# Patient Record
Sex: MALE | Race: WHITE | NOT HISPANIC OR LATINO | Employment: FULL TIME | URBAN - METROPOLITAN AREA
[De-identification: names, ages, dates, MRNs, and addresses within clinical notes are randomized per-mention and may not be internally consistent; named-entity substitution may affect disease eponyms.]

---

## 2023-10-29 ASSESSMENT — ENCOUNTER SYMPTOMS
DIZZINESS: 0
SORE THROAT: 0
MYALGIAS: 0
DIFFICULTY URINATING: 0
DYSPHORIC MOOD: 0
EYE PAIN: 0
SHORTNESS OF BREATH: 0
ABDOMINAL PAIN: 0
ARTHRALGIAS: 0
PALPITATIONS: 0
NERVOUS/ANXIOUS: 0
FATIGUE: 0
HEADACHES: 0

## 2023-10-29 NOTE — PROGRESS NOTES
"Subjective   Patient ID: Med Thomson is a 48 y.o. male who presents for Annual Exam (Cpe.).    HPI   Presents for physical exam. Here for wellness exam for insurance/work. No c/o.  Is fasting.   Has been watching diet.  Doing some exercise several days per week.     Review of Systems   Constitutional:  Negative for fatigue.   HENT:  Negative for congestion, ear pain and sore throat.         Gets mild allergic rhinitis in the spring for which he rarely uses Allegra as needed and it works well.     Eyes:  Negative for pain and visual disturbance.   Respiratory:  Negative for shortness of breath.    Cardiovascular:  Negative for chest pain and palpitations.   Gastrointestinal:  Negative for abdominal pain, constipation, diarrhea, nausea and vomiting.        GERD well controlled with Prilosec. Sees GI Dr Vela due to hx of Barretts.   Still taking Desipramine 10mg qd as well.  Had EGD 7/2020. Had colonoscopy in 9/2022.     Genitourinary:  Negative for difficulty urinating and testicular pain.   Musculoskeletal:  Negative for arthralgias and myalgias.   Skin:  Negative for rash.   Neurological:  Negative for dizziness and headaches.   Psychiatric/Behavioral:  Negative for dysphoric mood. The patient is not nervous/anxious.      Past Medical History:   Diagnosis Date    Personal history of other diseases of the digestive system     History of esophageal reflux      Family History   Problem Relation Name Age of Onset    Rheum arthritis Mother      Other (HTN) Father      Melanoma Mother's Brother        Social History     Tobacco Use    Smoking status: Never    Smokeless tobacco: Never   Substance Use Topics    Alcohol use: Yes     Comment: few drinks per month    Drug use: Never          Objective   /74   Pulse 85   Temp 36.4 °C (97.5 °F)   Ht 1.715 m (5' 7.5\")   Wt 87.5 kg (193 lb)   SpO2 99%   BMI 29.78 kg/m²     Physical Exam  Vitals and nursing note reviewed.   Constitutional:       Appearance: Normal " appearance. He is well-developed.   HENT:      Head: Normocephalic.      Right Ear: Tympanic membrane, ear canal and external ear normal.      Left Ear: Tympanic membrane, ear canal and external ear normal.      Nose: Nose normal.      Mouth/Throat:      Mouth: Mucous membranes are moist.      Pharynx: Oropharynx is clear.   Eyes:      General: No scleral icterus.     Extraocular Movements: Extraocular movements intact.      Pupils: Pupils are equal, round, and reactive to light.   Cardiovascular:      Rate and Rhythm: Normal rate and regular rhythm.      Heart sounds: No murmur heard.  Pulmonary:      Effort: Pulmonary effort is normal.      Breath sounds: Normal breath sounds.   Abdominal:      Palpations: Abdomen is soft. There is no mass.      Tenderness: There is no abdominal tenderness.   Musculoskeletal:         General: Normal range of motion.      Cervical back: Neck supple.   Lymphadenopathy:      Cervical: No cervical adenopathy.   Skin:     General: Skin is warm and dry.   Neurological:      General: No focal deficit present.      Mental Status: He is alert.      Gait: Gait normal.   Psychiatric:         Mood and Affect: Mood normal.         Behavior: Behavior normal.       Assessment/Plan   Diagnoses and all orders for this visit:  Routine general medical examination at a health care facility  -     Lipid Panel; Future  -     Basic Metabolic Panel; Future  Encounter for immunization  -     Flu vaccine (IIV4) age 6 months and greater, preservative free      Discussed wellness issues.  Discussed diet and exercise.    Get fasting labs.   Encouraged weight loss due to high BMI.   Flu shot given.   Follow up as needed.

## 2023-10-30 ENCOUNTER — OFFICE VISIT (OUTPATIENT)
Dept: PRIMARY CARE | Facility: CLINIC | Age: 48
End: 2023-10-30
Payer: COMMERCIAL

## 2023-10-30 ENCOUNTER — LAB (OUTPATIENT)
Dept: LAB | Facility: LAB | Age: 48
End: 2023-10-30
Payer: COMMERCIAL

## 2023-10-30 VITALS
DIASTOLIC BLOOD PRESSURE: 74 MMHG | HEIGHT: 68 IN | WEIGHT: 193 LBS | SYSTOLIC BLOOD PRESSURE: 126 MMHG | HEART RATE: 85 BPM | OXYGEN SATURATION: 99 % | BODY MASS INDEX: 29.25 KG/M2 | TEMPERATURE: 97.5 F

## 2023-10-30 DIAGNOSIS — Z23 ENCOUNTER FOR IMMUNIZATION: ICD-10-CM

## 2023-10-30 DIAGNOSIS — Z00.00 ROUTINE GENERAL MEDICAL EXAMINATION AT A HEALTH CARE FACILITY: Primary | ICD-10-CM

## 2023-10-30 DIAGNOSIS — Z00.00 ROUTINE GENERAL MEDICAL EXAMINATION AT A HEALTH CARE FACILITY: ICD-10-CM

## 2023-10-30 PROBLEM — J45.909 ASTHMA WITHOUT STATUS ASTHMATICUS (HHS-HCC): Status: ACTIVE | Noted: 2023-10-30

## 2023-10-30 PROBLEM — E78.5 HYPERLIPIDEMIA: Status: ACTIVE | Noted: 2017-12-07

## 2023-10-30 LAB
ANION GAP SERPL CALC-SCNC: 11 MMOL/L (ref 10–20)
BUN SERPL-MCNC: 19 MG/DL (ref 6–23)
CALCIUM SERPL-MCNC: 10 MG/DL (ref 8.6–10.3)
CHLORIDE SERPL-SCNC: 103 MMOL/L (ref 98–107)
CHOLEST SERPL-MCNC: 213 MG/DL (ref 0–199)
CHOLESTEROL/HDL RATIO: 6
CO2 SERPL-SCNC: 29 MMOL/L (ref 21–32)
CREAT SERPL-MCNC: 0.99 MG/DL (ref 0.5–1.3)
GFR SERPL CREATININE-BSD FRML MDRD: >90 ML/MIN/1.73M*2
GLUCOSE SERPL-MCNC: 89 MG/DL (ref 74–99)
HDLC SERPL-MCNC: 35.6 MG/DL
LDLC SERPL CALC-MCNC: 126 MG/DL
NON HDL CHOLESTEROL: 177 MG/DL (ref 0–149)
POTASSIUM SERPL-SCNC: 4.2 MMOL/L (ref 3.5–5.3)
SODIUM SERPL-SCNC: 139 MMOL/L (ref 136–145)
TRIGL SERPL-MCNC: 256 MG/DL (ref 0–149)
VLDL: 51 MG/DL (ref 0–40)

## 2023-10-30 PROCEDURE — 90471 IMMUNIZATION ADMIN: CPT | Performed by: PHYSICIAN ASSISTANT

## 2023-10-30 PROCEDURE — 80048 BASIC METABOLIC PNL TOTAL CA: CPT

## 2023-10-30 PROCEDURE — 1036F TOBACCO NON-USER: CPT | Performed by: PHYSICIAN ASSISTANT

## 2023-10-30 PROCEDURE — 99396 PREV VISIT EST AGE 40-64: CPT | Performed by: PHYSICIAN ASSISTANT

## 2023-10-30 PROCEDURE — 36415 COLL VENOUS BLD VENIPUNCTURE: CPT

## 2023-10-30 PROCEDURE — 90686 IIV4 VACC NO PRSV 0.5 ML IM: CPT | Performed by: PHYSICIAN ASSISTANT

## 2023-10-30 PROCEDURE — 80061 LIPID PANEL: CPT

## 2023-10-30 RX ORDER — DESIPRAMINE HYDROCHLORIDE 10 MG/1
10 TABLET ORAL DAILY
COMMUNITY
End: 2024-05-16

## 2023-10-30 RX ORDER — OMEPRAZOLE 20 MG/1
20 CAPSULE, DELAYED RELEASE ORAL DAILY
COMMUNITY
Start: 2023-10-19 | End: 2023-11-14 | Stop reason: DRUGHIGH

## 2023-10-30 ASSESSMENT — ENCOUNTER SYMPTOMS
NAUSEA: 0
VOMITING: 0
CONSTIPATION: 0
DIARRHEA: 0

## 2023-11-14 DIAGNOSIS — K21.9 GASTROESOPHAGEAL REFLUX DISEASE, UNSPECIFIED WHETHER ESOPHAGITIS PRESENT: Primary | ICD-10-CM

## 2023-11-14 RX ORDER — OMEPRAZOLE 40 MG/1
40 CAPSULE, DELAYED RELEASE ORAL DAILY
Qty: 30 CAPSULE | Refills: 2 | Status: SHIPPED | OUTPATIENT
Start: 2023-11-14 | End: 2024-02-16

## 2024-01-22 ENCOUNTER — OFFICE VISIT (OUTPATIENT)
Dept: PRIMARY CARE | Facility: CLINIC | Age: 49
End: 2024-01-22
Payer: COMMERCIAL

## 2024-01-22 VITALS
TEMPERATURE: 97.2 F | HEART RATE: 77 BPM | DIASTOLIC BLOOD PRESSURE: 74 MMHG | SYSTOLIC BLOOD PRESSURE: 136 MMHG | OXYGEN SATURATION: 96 % | WEIGHT: 196 LBS | BODY MASS INDEX: 30.24 KG/M2

## 2024-01-22 DIAGNOSIS — H69.93 DYSFUNCTION OF BOTH EUSTACHIAN TUBES: Primary | ICD-10-CM

## 2024-01-22 PROCEDURE — 99213 OFFICE O/P EST LOW 20 MIN: CPT | Performed by: PHYSICIAN ASSISTANT

## 2024-01-22 PROCEDURE — 1036F TOBACCO NON-USER: CPT | Performed by: PHYSICIAN ASSISTANT

## 2024-01-22 RX ORDER — FLUTICASONE PROPIONATE 50 MCG
2 SPRAY, SUSPENSION (ML) NASAL DAILY
Qty: 16 G | Refills: 0 | Status: SHIPPED | OUTPATIENT
Start: 2024-01-22 | End: 2024-02-26

## 2024-01-22 ASSESSMENT — ENCOUNTER SYMPTOMS: SHORTNESS OF BREATH: 0

## 2024-01-22 NOTE — PROGRESS NOTES
"Subjective   Patient ID: Rickey Thomson \"Med\" is a 48 y.o. male who presents for Ear Fullness (L ear pain x 4 days///Pt had covid before frantz).    HPI     Left ear has been muffled the past month. Sometimes pops a little. Pressure is increasing   Had COVID just before Frantz. Still had some congestion for a couple weeks afterward but eventually resolved.  Ears started feeling muffled during this time.  Is getting a little bit more frequent the past 4 days though.    No fevers or chills.  No significant cough remaining.     reports that he has never smoked. He has never used smokeless tobacco.    Review of Systems   Respiratory:  Negative for shortness of breath.        Objective   /74   Pulse 77   Temp 36.2 °C (97.2 °F)   Wt 88.9 kg (196 lb)   SpO2 96%   BMI 30.24 kg/m²     Physical Exam  Vitals and nursing note reviewed.   Constitutional:       Appearance: Normal appearance.   HENT:      Head: Normocephalic.      Right Ear: Ear canal and external ear normal.      Left Ear: Ear canal and external ear normal.      Ears:      Comments: Increased fluid behind TMs (left> right), without erythema.     Nose: No congestion.      Right Sinus: Frontal sinus tenderness present.      Left Sinus: Frontal sinus tenderness present.      Mouth/Throat:      Mouth: Mucous membranes are moist.      Pharynx: No oropharyngeal exudate or posterior oropharyngeal erythema.   Eyes:      General: No scleral icterus.  Cardiovascular:      Rate and Rhythm: Normal rate and regular rhythm.   Pulmonary:      Effort: Pulmonary effort is normal.      Breath sounds: Normal breath sounds.   Lymphadenopathy:      Cervical: No cervical adenopathy.   Skin:     General: Skin is warm and dry.   Neurological:      Mental Status: He is alert.       Assessment/Plan   Diagnoses and all orders for this visit:  Dysfunction of both eustachian tubes  -     fluticasone (Flonase) 50 mcg/actuation nasal spray; Administer 2 sprays into each " nostril once daily.     Rx Flonase.  Can use OTC Mucinex or decongestant.  Hydrate well.  Follow-up if symptoms significant increase or persist.

## 2024-02-16 DIAGNOSIS — K21.9 GASTROESOPHAGEAL REFLUX DISEASE, UNSPECIFIED WHETHER ESOPHAGITIS PRESENT: ICD-10-CM

## 2024-02-16 RX ORDER — OMEPRAZOLE 40 MG/1
40 CAPSULE, DELAYED RELEASE ORAL DAILY
Qty: 90 CAPSULE | Refills: 0 | Status: SHIPPED | OUTPATIENT
Start: 2024-02-16 | End: 2024-05-14

## 2024-02-26 ENCOUNTER — OFFICE VISIT (OUTPATIENT)
Dept: PRIMARY CARE | Facility: CLINIC | Age: 49
End: 2024-02-26
Payer: COMMERCIAL

## 2024-02-26 VITALS
OXYGEN SATURATION: 98 % | DIASTOLIC BLOOD PRESSURE: 96 MMHG | SYSTOLIC BLOOD PRESSURE: 148 MMHG | TEMPERATURE: 97.5 F | HEART RATE: 101 BPM

## 2024-02-26 DIAGNOSIS — J01.90 ACUTE SINUSITIS, RECURRENCE NOT SPECIFIED, UNSPECIFIED LOCATION: Primary | ICD-10-CM

## 2024-02-26 PROCEDURE — 99214 OFFICE O/P EST MOD 30 MIN: CPT | Performed by: PHYSICIAN ASSISTANT

## 2024-02-26 PROCEDURE — 1036F TOBACCO NON-USER: CPT | Performed by: PHYSICIAN ASSISTANT

## 2024-02-26 RX ORDER — AMOXICILLIN AND CLAVULANATE POTASSIUM 875; 125 MG/1; MG/1
875 TABLET, FILM COATED ORAL 2 TIMES DAILY
Qty: 20 TABLET | Refills: 0 | Status: SHIPPED | OUTPATIENT
Start: 2024-02-26 | End: 2024-03-07

## 2024-02-26 ASSESSMENT — ENCOUNTER SYMPTOMS
PALPITATIONS: 0
ABDOMINAL PAIN: 0

## 2024-02-26 NOTE — PROGRESS NOTES
"Subjective   Patient ID: Rickey Thomson \"Med\" is a 48 y.o. male who presents for Sinusitis (Facial pain, sinus pressure, Drainage, HA x 8 to 9 days).    HPI   Patient c/o cough, nasal discharge or sinus pain. Denies fever, chills, aches, shortness of breath and sore throat.  Present for 9 days. Has worsened since onset.   Cough: Cough is mildly productive of greenish sputum -- thinks mostly from PND.   Nasal discharge: Described as purulent.   Sinus pain: Experiencing frontal pain and facial pain.   Denies associated diarrhea, earache and vomiting.     Taking OTC sudafed and mucinex. Using Flonase.     Patient denies recent known COVID exposure or international travel.        reports that he has never smoked. He has never used smokeless tobacco.    Review of Systems   Cardiovascular:  Negative for chest pain and palpitations.   Gastrointestinal:  Negative for abdominal pain.       Objective   BP (!) 148/96   Pulse 101   Temp 36.4 °C (97.5 °F)   SpO2 98%     Physical Exam  Vitals and nursing note reviewed.   Constitutional:       Appearance: Normal appearance.   HENT:      Head: Normocephalic.      Right Ear: Tympanic membrane and ear canal normal.      Left Ear: Tympanic membrane and ear canal normal.      Nose: Congestion present.      Right Sinus: Maxillary sinus tenderness and frontal sinus tenderness present.      Left Sinus: Maxillary sinus tenderness and frontal sinus tenderness present.      Mouth/Throat:      Mouth: Mucous membranes are moist.      Pharynx: No oropharyngeal exudate or posterior oropharyngeal erythema.   Eyes:      General: No scleral icterus.  Cardiovascular:      Rate and Rhythm: Normal rate and regular rhythm.   Pulmonary:      Effort: Pulmonary effort is normal.      Breath sounds: Normal breath sounds.   Lymphadenopathy:      Cervical: No cervical adenopathy.   Skin:     General: Skin is warm and dry.   Neurological:      Mental Status: He is alert.         Assessment/Plan "   Diagnoses and all orders for this visit:  Acute sinusitis, recurrence not specified, unspecified location  -     amoxicillin-pot clavulanate (Augmentin) 875-125 mg tablet; Take 1 tablet (875 mg) by mouth 2 times a day for 10 days.       Rx augmentin.   Can use Mucinex DM.   Encourage fluids and rest.   Follow up if symptoms increase or persist.

## 2024-05-14 DIAGNOSIS — K21.9 GASTROESOPHAGEAL REFLUX DISEASE, UNSPECIFIED WHETHER ESOPHAGITIS PRESENT: ICD-10-CM

## 2024-05-14 RX ORDER — OMEPRAZOLE 40 MG/1
40 CAPSULE, DELAYED RELEASE ORAL DAILY
Qty: 90 CAPSULE | Refills: 0 | Status: SHIPPED | OUTPATIENT
Start: 2024-05-14

## 2024-05-15 DIAGNOSIS — K30 FUNCTIONAL DYSPEPSIA: ICD-10-CM

## 2024-05-16 RX ORDER — DESIPRAMINE HYDROCHLORIDE 10 MG/1
10 TABLET ORAL DAILY
Qty: 90 TABLET | Refills: 2 | Status: SHIPPED | OUTPATIENT
Start: 2024-05-16

## 2024-06-12 DIAGNOSIS — K30 FUNCTIONAL DYSPEPSIA: ICD-10-CM

## 2024-06-13 RX ORDER — DESIPRAMINE HYDROCHLORIDE 10 MG/1
10 TABLET ORAL DAILY
Qty: 90 TABLET | Refills: 0 | Status: SHIPPED | OUTPATIENT
Start: 2024-06-13

## 2024-06-26 ENCOUNTER — APPOINTMENT (OUTPATIENT)
Dept: GASTROENTEROLOGY | Facility: CLINIC | Age: 49
End: 2024-06-26
Payer: COMMERCIAL

## 2024-06-26 VITALS — WEIGHT: 190 LBS | BODY MASS INDEX: 28.79 KG/M2 | HEIGHT: 68 IN

## 2024-06-26 DIAGNOSIS — K22.70 BARRETT'S ESOPHAGUS WITHOUT DYSPLASIA: Primary | ICD-10-CM

## 2024-06-26 DIAGNOSIS — K30 FUNCTIONAL DYSPEPSIA: ICD-10-CM

## 2024-06-26 DIAGNOSIS — K21.9 GASTROESOPHAGEAL REFLUX DISEASE, UNSPECIFIED WHETHER ESOPHAGITIS PRESENT: ICD-10-CM

## 2024-06-26 PROCEDURE — 99213 OFFICE O/P EST LOW 20 MIN: CPT | Performed by: INTERNAL MEDICINE

## 2024-06-26 PROCEDURE — 1036F TOBACCO NON-USER: CPT | Performed by: INTERNAL MEDICINE

## 2024-06-26 RX ORDER — OMEPRAZOLE 40 MG/1
40 CAPSULE, DELAYED RELEASE ORAL DAILY
Qty: 90 CAPSULE | Refills: 3 | Status: SHIPPED | OUTPATIENT
Start: 2024-06-26

## 2024-06-26 RX ORDER — DESIPRAMINE HYDROCHLORIDE 10 MG/1
10 TABLET ORAL DAILY
Qty: 90 TABLET | Refills: 3 | Status: SHIPPED | OUTPATIENT
Start: 2024-06-26

## 2024-06-26 NOTE — PROGRESS NOTES
REASON FOR VISIT: Follow-up reflux and stomach discomfort    HPI:  Rickey Thomson is a 49 y.o. male with a past medical history of chronic GERD as well as nondysplastic Messer's esophagus as well as functional dyspepsia being evaluated in the office for follow-up.  Last seen about 1 year ago.  Last upper endoscopy intestinal metaplasia was actually not seen.  Due for surveillance upper endoscopy in a year.  Taking omeprazole 40 mg once daily in the morning which controls his GERD symptoms.  When he tried to reduce to 20 mg he had significant breakthrough symptoms of GERD.  No dysphagia symptoms.  Dyspepsia controlled on his chronic use of desipramine 10 mg daily.  Has to watch certain foods including foods with red sauce as well as eggs which can be triggers for his dyspepsia symptoms.  No other complaints at this time.  No rectal bleeding or melena.          PRIOR ENDOSCOPY    PAST MEDICAL HISTORY  Past Medical History:   Diagnosis Date    Personal history of other diseases of the digestive system     History of esophageal reflux       PAST SURGICAL HISTORY  No past surgical history on file.    FAMILY HISTORY  Family History   Problem Relation Name Age of Onset    Rheum arthritis Mother      Other (HTN) Father      Melanoma Mother's Brother         SOCIAL HISTORY  Social History     Tobacco Use    Smoking status: Never    Smokeless tobacco: Never   Substance Use Topics    Alcohol use: Not Currently     Comment: few drinks per month       REVIEW OF SYSTEMS  CONSTITUTIONAL: negative for fever, chills, fatigue, or unintentional weight loss,   HEENT: negative for icteric sclera, eye pain/redness, or changes in vision/hearing  RESPIRATORY: negative for cough, hemoptysis, wheezing, orthopnea, or dyspnea on exertion  CARDIOVASCULAR: negative for chest pain, palpitations, or syncope   GASTROINTESTINAL: as noted per HPI  GENITOURINARY: negative for dysuria, polyuria, incontinence, or hematuria  MUSCULOSKELETAL: negative  "for arthralgia, myalgia, or joint swelling/stiffness   INTEGUMENTARY/SKIN: negative jaundice, rash, or skin lesion  HEMATOLOGIC/LYMPHATIC: negative for prolonged bleeding, easy bruising, or swollen lymph nodes  ENDOCRINE: negative for cold/heat intolerance, polydipsia, polyuria, or goiter  NEUROLOGIC: negative for headaches, dizziness, tremor, or gait abnormality  PSYCHIATRIC: negative for anxiety, depression, personality changes, or sleep disturbances      A 10 point review of systems was completed and was otherwise negative.    ALLERGIES  No Known Allergies    MEDICATIONS  Current Outpatient Medications   Medication Sig Dispense Refill    desipramine (Norpramin) 10 mg tablet Take 1 tablet (10 mg) by mouth once daily. 90 tablet 3    fluticasone (Flonase) 50 mcg/actuation nasal spray Administer 2 sprays into each nostril once daily. 16 g 0    omeprazole (PriLOSEC) 40 mg DR capsule Take 1 capsule (40 mg) by mouth once daily. 90 capsule 3     No current facility-administered medications for this visit.       VITALS  Ht 1.715 m (5' 7.5\")   Wt 86.2 kg (190 lb)   BMI 29.32 kg/m²      PHYSICAL EXAM  Alert and oriented in no acute distress  Cardiac: Regular rate and rhythm with no murmurs rubs or gallops  Lungs: Clear to auscultation bilaterally  Abdomen: Soft, minimal left upper quadrant tenderness with palpation, no hepatosplenomegaly, no hernias    ASSESSMENT/ PLAN  Patient with functional dyspepsia controlled with desipramine 10 mg daily which we will continue.  History of GERD and nondysplastic Messer's esophagus.  Will continue omeprazole 40 mg once daily.  No alarm symptoms at this time.  Due for surveillance upper endoscopy in a year.  He is in agreement with the plan.        Signature: Yrn Vela MD    Date: 6/26/2024  Time: 3:16 PM    "

## 2024-11-11 ENCOUNTER — LAB (OUTPATIENT)
Dept: LAB | Facility: LAB | Age: 49
End: 2024-11-11
Payer: COMMERCIAL

## 2024-11-11 ENCOUNTER — APPOINTMENT (OUTPATIENT)
Dept: PRIMARY CARE | Facility: CLINIC | Age: 49
End: 2024-11-11
Payer: COMMERCIAL

## 2024-11-11 VITALS
BODY MASS INDEX: 29.4 KG/M2 | DIASTOLIC BLOOD PRESSURE: 74 MMHG | TEMPERATURE: 97.5 F | OXYGEN SATURATION: 97 % | HEART RATE: 87 BPM | HEIGHT: 68 IN | SYSTOLIC BLOOD PRESSURE: 124 MMHG | WEIGHT: 194 LBS

## 2024-11-11 DIAGNOSIS — Z00.00 ROUTINE GENERAL MEDICAL EXAMINATION AT A HEALTH CARE FACILITY: Primary | ICD-10-CM

## 2024-11-11 DIAGNOSIS — Z11.4 SCREENING FOR HIV (HUMAN IMMUNODEFICIENCY VIRUS): ICD-10-CM

## 2024-11-11 DIAGNOSIS — Z00.00 ROUTINE GENERAL MEDICAL EXAMINATION AT A HEALTH CARE FACILITY: ICD-10-CM

## 2024-11-11 DIAGNOSIS — Z23 ENCOUNTER FOR IMMUNIZATION: ICD-10-CM

## 2024-11-11 DIAGNOSIS — Z11.59 ENCOUNTER FOR HEPATITIS C SCREENING TEST FOR LOW RISK PATIENT: ICD-10-CM

## 2024-11-11 LAB
ANION GAP SERPL CALC-SCNC: 10 MMOL/L (ref 10–20)
BUN SERPL-MCNC: 20 MG/DL (ref 6–23)
CALCIUM SERPL-MCNC: 9.5 MG/DL (ref 8.6–10.3)
CHLORIDE SERPL-SCNC: 103 MMOL/L (ref 98–107)
CHOLEST SERPL-MCNC: 198 MG/DL (ref 0–199)
CHOLESTEROL/HDL RATIO: 5.2
CO2 SERPL-SCNC: 32 MMOL/L (ref 21–32)
CREAT SERPL-MCNC: 1.04 MG/DL (ref 0.5–1.3)
EGFRCR SERPLBLD CKD-EPI 2021: 88 ML/MIN/1.73M*2
GLUCOSE SERPL-MCNC: 90 MG/DL (ref 74–99)
HCV AB SER QL: NONREACTIVE
HDLC SERPL-MCNC: 37.8 MG/DL
HIV 1+2 AB+HIV1 P24 AG SERPL QL IA: NONREACTIVE
LDLC SERPL CALC-MCNC: 120 MG/DL
NON HDL CHOLESTEROL: 160 MG/DL (ref 0–149)
POTASSIUM SERPL-SCNC: 4.5 MMOL/L (ref 3.5–5.3)
SODIUM SERPL-SCNC: 140 MMOL/L (ref 136–145)
TRIGL SERPL-MCNC: 199 MG/DL (ref 0–149)
VLDL: 40 MG/DL (ref 0–40)

## 2024-11-11 PROCEDURE — 99396 PREV VISIT EST AGE 40-64: CPT | Performed by: PHYSICIAN ASSISTANT

## 2024-11-11 PROCEDURE — 80061 LIPID PANEL: CPT

## 2024-11-11 PROCEDURE — 80048 BASIC METABOLIC PNL TOTAL CA: CPT

## 2024-11-11 PROCEDURE — 1036F TOBACCO NON-USER: CPT | Performed by: PHYSICIAN ASSISTANT

## 2024-11-11 PROCEDURE — 90471 IMMUNIZATION ADMIN: CPT | Performed by: PHYSICIAN ASSISTANT

## 2024-11-11 PROCEDURE — 36415 COLL VENOUS BLD VENIPUNCTURE: CPT

## 2024-11-11 PROCEDURE — 86803 HEPATITIS C AB TEST: CPT

## 2024-11-11 PROCEDURE — 90656 IIV3 VACC NO PRSV 0.5 ML IM: CPT | Performed by: PHYSICIAN ASSISTANT

## 2024-11-11 PROCEDURE — 87389 HIV-1 AG W/HIV-1&-2 AB AG IA: CPT

## 2024-11-11 PROCEDURE — 3008F BODY MASS INDEX DOCD: CPT | Performed by: PHYSICIAN ASSISTANT

## 2024-11-11 ASSESSMENT — ENCOUNTER SYMPTOMS
NERVOUS/ANXIOUS: 0
FATIGUE: 0
HEADACHES: 0
ABDOMINAL PAIN: 0
DIZZINESS: 0
PALPITATIONS: 0
DIARRHEA: 0
VOMITING: 0
CONSTIPATION: 0
EYE PAIN: 0
DIFFICULTY URINATING: 0
ARTHRALGIAS: 0
NAUSEA: 0
DYSPHORIC MOOD: 0
SORE THROAT: 0
MYALGIAS: 0
SHORTNESS OF BREATH: 0

## 2024-11-11 ASSESSMENT — PATIENT HEALTH QUESTIONNAIRE - PHQ9
2. FEELING DOWN, DEPRESSED OR HOPELESS: NOT AT ALL
SUM OF ALL RESPONSES TO PHQ9 QUESTIONS 1 AND 2: 0
1. LITTLE INTEREST OR PLEASURE IN DOING THINGS: NOT AT ALL

## 2024-11-11 NOTE — PROGRESS NOTES
"Subjective   Patient ID: Med Thomson is a 49 y.o. male who presents for Annual Exam (cpe).    HPI   Presents for physical exam. Here for wellness exam for insurance/work. No c/o.  Is fasting.   Still watching diet.  Doing some exercise several days per week.     Review of Systems   Constitutional:  Negative for fatigue.   HENT:  Negative for congestion, ear pain and sore throat.         Gets mild allergic rhinitis in the spring for which he rarely uses Allegra as needed and it works well.     Eyes:  Negative for pain and visual disturbance.   Respiratory:  Negative for shortness of breath.    Cardiovascular:  Negative for chest pain and palpitations.   Gastrointestinal:  Negative for abdominal pain, constipation, diarrhea, nausea and vomiting.        GERD well controlled with Prilosec. Sees GI Dr Vela due to hx of Barretts.   Still taking Desipramine 10mg qd as well.  Had EGD 7/2020 --due in 2025. Had colonoscopy in 9/2022 -- due in 2027.      Genitourinary:  Negative for difficulty urinating and testicular pain.   Musculoskeletal:  Negative for arthralgias and myalgias.   Skin:  Negative for rash.   Neurological:  Negative for dizziness and headaches.   Psychiatric/Behavioral:  Negative for dysphoric mood. The patient is not nervous/anxious.      Past Medical History:   Diagnosis Date    Personal history of other diseases of the digestive system     History of esophageal reflux      Family History   Problem Relation Name Age of Onset    Rheum arthritis Mother      Other (HTN) Father      Atrial fibrillation Father      Melanoma Mother's Brother      Other (pacemaker) Father's Brother        Social History     Tobacco Use    Smoking status: Never    Smokeless tobacco: Never   Vaping Use    Vaping status: Never Used   Substance Use Topics    Alcohol use: Not Currently     Comment: few drinks per month    Drug use: Never          Objective   /74   Pulse 87   Temp 36.4 °C (97.5 °F)   Ht 1.715 m (5' 7.5\")  "  Wt 88 kg (194 lb)   SpO2 97%   BMI 29.94 kg/m²     Physical Exam  Vitals and nursing note reviewed.   Constitutional:       Appearance: Normal appearance. He is well-developed.   HENT:      Head: Normocephalic.      Right Ear: Tympanic membrane, ear canal and external ear normal.      Left Ear: Tympanic membrane, ear canal and external ear normal.      Nose: Nose normal.      Mouth/Throat:      Mouth: Mucous membranes are moist.      Pharynx: Oropharynx is clear.   Eyes:      General: No scleral icterus.     Extraocular Movements: Extraocular movements intact.      Pupils: Pupils are equal, round, and reactive to light.   Cardiovascular:      Rate and Rhythm: Normal rate and regular rhythm.      Heart sounds: No murmur heard.  Pulmonary:      Effort: Pulmonary effort is normal.      Breath sounds: Normal breath sounds.   Abdominal:      Palpations: Abdomen is soft. There is no mass.      Tenderness: There is no abdominal tenderness.   Musculoskeletal:         General: Normal range of motion.      Cervical back: Neck supple.   Lymphadenopathy:      Cervical: No cervical adenopathy.   Skin:     General: Skin is warm and dry.   Neurological:      General: No focal deficit present.      Mental Status: He is alert.      Gait: Gait normal.   Psychiatric:         Mood and Affect: Mood normal.         Behavior: Behavior normal.       Assessment/Plan   Diagnoses and all orders for this visit:  Routine general medical examination at a health care facility  -     Basic Metabolic Panel; Future  -     Lipid Panel; Future  -     Hepatitis C Antibody; Future  -     HIV 1/2 Antigen/Antibody Screen with Reflex to Confirmation; Future  Encounter for immunization  -     Flu vaccine, trivalent, preservative free, age 6 months and greater (Fluraix/Fluzone/Flulaval)  Screening for HIV (human immunodeficiency virus)  -     HIV 1/2 Antigen/Antibody Screen with Reflex to Confirmation; Future  Encounter for hepatitis C screening test for  low risk patient  -     Hepatitis C Antibody; Future        Discussed wellness issues.  Discussed diet and exercise.    Get fasting labs.   Encouraged weight loss due to high BMI.   Flu shot given.   Follow up as needed.

## 2025-02-10 ENCOUNTER — OFFICE VISIT (OUTPATIENT)
Dept: PRIMARY CARE | Facility: CLINIC | Age: 50
End: 2025-02-10
Payer: COMMERCIAL

## 2025-02-10 VITALS
BODY MASS INDEX: 30.71 KG/M2 | DIASTOLIC BLOOD PRESSURE: 74 MMHG | HEART RATE: 84 BPM | SYSTOLIC BLOOD PRESSURE: 130 MMHG | OXYGEN SATURATION: 99 % | WEIGHT: 199 LBS | TEMPERATURE: 97.1 F

## 2025-02-10 DIAGNOSIS — S39.011A STRAIN OF ABDOMINAL WALL, INITIAL ENCOUNTER: Primary | ICD-10-CM

## 2025-02-10 PROCEDURE — 99214 OFFICE O/P EST MOD 30 MIN: CPT | Performed by: PHYSICIAN ASSISTANT

## 2025-02-10 RX ORDER — DICLOFENAC SODIUM 75 MG/1
75 TABLET, DELAYED RELEASE ORAL 2 TIMES DAILY PRN
Qty: 20 TABLET | Refills: 0 | Status: SHIPPED | OUTPATIENT
Start: 2025-02-10 | End: 2025-02-20

## 2025-02-10 RX ORDER — TIZANIDINE 2 MG/1
2 TABLET ORAL 2 TIMES DAILY PRN
Qty: 20 TABLET | Refills: 0 | Status: SHIPPED | OUTPATIENT
Start: 2025-02-10 | End: 2025-02-20

## 2025-02-10 NOTE — PROGRESS NOTES
"Subjective   Patient ID: Rickey Thomson \"Med\" is a 49 y.o. male who presents for Flank Pain (L lower flank pain x 3 days. Pt states he sneezed and it starting hurting).    HPI   3 days ago he sneezed and felt something pull on left lateral abdominal area. Sneezed again later that day and it worsened the pain. The area is a little sore with bending/twisting.  Coughing or sneezing aggravates it. No SOB. No cough. No abdominal pain. Area feels tight. Symptoms starting to improve.        reports that he has never smoked. He has never used smokeless tobacco.    Review of Systems   Constitutional:  Negative for chills and fever.   Respiratory:  Negative for cough and shortness of breath.    Cardiovascular:  Negative for chest pain.       Objective   /74   Pulse 84   Temp 36.2 °C (97.1 °F)   Wt 90.3 kg (199 lb)   SpO2 99%   BMI 30.71 kg/m²     Physical Exam  Vitals and nursing note reviewed.   HENT:      Head: Normocephalic.   Eyes:      General: No scleral icterus.  Cardiovascular:      Rate and Rhythm: Normal rate and regular rhythm.   Pulmonary:      Effort: Pulmonary effort is normal.      Breath sounds: Normal breath sounds.   Abdominal:      General: Abdomen is flat.      Palpations: Abdomen is soft.      Comments: Has mild tenderness over the left lateral abdominal muscle wall just under the lower lateral ribs.  No tenderness over the lateral ribs.    Skin:     General: Skin is warm and dry.   Neurological:      Mental Status: He is alert.   Psychiatric:         Mood and Affect: Affect normal.         Assessment/Plan   Diagnoses and all orders for this visit:  Strain of abdominal wall, initial encounter  -     diclofenac (Voltaren) 75 mg EC tablet; Take 1 tablet (75 mg) by mouth 2 times a day as needed (pain) for up to 10 days. With food  -     tiZANidine (Zanaflex) 2 mg tablet; Take 1 tablet (2 mg) by mouth 2 times a day as needed for muscle spasms for up to 10 days.       Use heating pad.   Do " gentle stretches.   Rx diclofenac bid with food.   Rx tizanidine prn.   Follow up if symptoms significantly increase or persist.

## 2025-02-16 ASSESSMENT — ENCOUNTER SYMPTOMS
FEVER: 0
CHILLS: 0
SHORTNESS OF BREATH: 0
COUGH: 0

## 2025-03-05 DIAGNOSIS — K30 FUNCTIONAL DYSPEPSIA: ICD-10-CM

## 2025-03-05 RX ORDER — DESIPRAMINE HYDROCHLORIDE 10 MG/1
10 TABLET ORAL DAILY
Qty: 90 TABLET | Refills: 0 | Status: SHIPPED | OUTPATIENT
Start: 2025-03-05

## 2025-03-08 ENCOUNTER — OFFICE VISIT (OUTPATIENT)
Dept: URGENT CARE | Age: 50
End: 2025-03-08
Payer: COMMERCIAL

## 2025-03-08 VITALS
TEMPERATURE: 98.2 F | DIASTOLIC BLOOD PRESSURE: 104 MMHG | OXYGEN SATURATION: 98 % | HEART RATE: 89 BPM | SYSTOLIC BLOOD PRESSURE: 164 MMHG

## 2025-03-08 DIAGNOSIS — H66.92 ACUTE LEFT OTITIS MEDIA: Primary | ICD-10-CM

## 2025-03-08 DIAGNOSIS — R03.0 ELEVATED BLOOD PRESSURE READING: ICD-10-CM

## 2025-03-08 DIAGNOSIS — J01.00 ACUTE NON-RECURRENT MAXILLARY SINUSITIS: ICD-10-CM

## 2025-03-08 RX ORDER — AMOXICILLIN AND CLAVULANATE POTASSIUM 875; 125 MG/1; MG/1
1 TABLET, FILM COATED ORAL 2 TIMES DAILY
Qty: 20 TABLET | Refills: 0 | Status: SHIPPED | OUTPATIENT
Start: 2025-03-08 | End: 2025-03-18

## 2025-03-08 ASSESSMENT — ENCOUNTER SYMPTOMS
CHEST TIGHTNESS: 0
RHINORRHEA: 1
COUGH: 0
CHILLS: 0
SHORTNESS OF BREATH: 0
WHEEZING: 0
FEVER: 0
SORE THROAT: 0

## 2025-06-10 DIAGNOSIS — K30 FUNCTIONAL DYSPEPSIA: ICD-10-CM

## 2025-06-10 RX ORDER — DESIPRAMINE HYDROCHLORIDE 10 MG/1
10 TABLET ORAL DAILY
Qty: 90 TABLET | Refills: 0 | Status: SHIPPED | OUTPATIENT
Start: 2025-06-10

## 2025-06-11 DIAGNOSIS — K21.9 GASTROESOPHAGEAL REFLUX DISEASE, UNSPECIFIED WHETHER ESOPHAGITIS PRESENT: ICD-10-CM

## 2025-06-12 RX ORDER — OMEPRAZOLE 40 MG/1
40 CAPSULE, DELAYED RELEASE ORAL DAILY
Qty: 30 CAPSULE | Refills: 0 | Status: SHIPPED | OUTPATIENT
Start: 2025-06-12

## 2025-06-23 ENCOUNTER — OFFICE VISIT (OUTPATIENT)
Dept: PRIMARY CARE | Facility: CLINIC | Age: 50
End: 2025-06-23
Payer: COMMERCIAL

## 2025-06-23 VITALS
SYSTOLIC BLOOD PRESSURE: 140 MMHG | TEMPERATURE: 97.5 F | OXYGEN SATURATION: 99 % | DIASTOLIC BLOOD PRESSURE: 84 MMHG | HEART RATE: 89 BPM

## 2025-06-23 DIAGNOSIS — H69.93 DYSFUNCTION OF BOTH EUSTACHIAN TUBES: ICD-10-CM

## 2025-06-23 DIAGNOSIS — R05.1 ACUTE COUGH: ICD-10-CM

## 2025-06-23 DIAGNOSIS — J01.90 ACUTE NON-RECURRENT SINUSITIS, UNSPECIFIED LOCATION: Primary | ICD-10-CM

## 2025-06-23 PROCEDURE — 99214 OFFICE O/P EST MOD 30 MIN: CPT | Performed by: PHYSICIAN ASSISTANT

## 2025-06-23 PROCEDURE — 1036F TOBACCO NON-USER: CPT | Performed by: PHYSICIAN ASSISTANT

## 2025-06-23 RX ORDER — AMOXICILLIN AND CLAVULANATE POTASSIUM 875; 125 MG/1; MG/1
875 TABLET, FILM COATED ORAL 2 TIMES DAILY
Qty: 20 TABLET | Refills: 0 | Status: SHIPPED | OUTPATIENT
Start: 2025-06-23 | End: 2025-07-03

## 2025-06-23 ASSESSMENT — ENCOUNTER SYMPTOMS
VOMITING: 0
ABDOMINAL PAIN: 0
DIARRHEA: 0
SHORTNESS OF BREATH: 0

## 2025-06-23 NOTE — PROGRESS NOTES
"Subjective   Patient ID: Rickey Thomson \"Med\" is a 50 y.o. male who presents for Cough and Nasal Congestion (And chest congestion x 9 days).    HPI   Patient c/o cough, nasal discharge or sinus pain. Denies fever, chills, aches, shortness of breath and sore throat.  Present for 9 days. Has worsened since onset.   Cough: Cough is productive of greenish sputum.   Nasal discharge: Described as purulent.   Sinus pain: Experiencing frontal pain.   Denies associated diarrhea and vomiting.  Ears plugged at times.     Taking OTC sudafed, mucinex and nasacort, as well as sinus rinses.     Patient denies recent known COVID exposure or international travel.        reports that he has never smoked. He has never used smokeless tobacco.    Review of Systems   Respiratory:  Negative for shortness of breath.    Cardiovascular:  Negative for chest pain.   Gastrointestinal:  Negative for abdominal pain, diarrhea and vomiting.       Objective   /84   Pulse 89   Temp 36.4 °C (97.5 °F)   SpO2 99%     Physical Exam  Vitals and nursing note reviewed.   Constitutional:       Appearance: Normal appearance.   HENT:      Head: Normocephalic.      Right Ear: Ear canal normal.      Left Ear: Ear canal normal.      Ears:      Comments: Increased fluid behind TM's without erythema.        Nose: Congestion present.      Right Sinus: Frontal sinus tenderness present.      Left Sinus: Frontal sinus tenderness present.      Mouth/Throat:      Mouth: Mucous membranes are moist.      Pharynx: No oropharyngeal exudate or posterior oropharyngeal erythema.   Eyes:      General: No scleral icterus.  Cardiovascular:      Rate and Rhythm: Normal rate and regular rhythm.   Pulmonary:      Effort: Pulmonary effort is normal.      Breath sounds: Normal breath sounds.   Lymphadenopathy:      Cervical: No cervical adenopathy.   Skin:     General: Skin is warm and dry.   Neurological:      Mental Status: He is alert.         Assessment/Plan "   Diagnoses and all orders for this visit:  Acute non-recurrent sinusitis, unspecified location  -     amoxicillin-clavulanate (Augmentin) 875-125 mg tablet; Take 1 tablet (875 mg of amoxicillin) by mouth 2 times a day for 10 days.  Acute cough  Dysfunction of both eustachian tubes       Rx Augmentin.   Can use Mucinex DM.  Use Nasacort.   Encourage fluids and rest.   Follow up if symptoms increase or persist.

## 2025-07-11 ENCOUNTER — APPOINTMENT (OUTPATIENT)
Dept: GASTROENTEROLOGY | Facility: EXTERNAL LOCATION | Age: 50
End: 2025-07-11
Payer: COMMERCIAL

## 2025-07-18 ENCOUNTER — APPOINTMENT (OUTPATIENT)
Dept: GASTROENTEROLOGY | Facility: EXTERNAL LOCATION | Age: 50
End: 2025-07-18
Payer: COMMERCIAL

## 2025-07-18 DIAGNOSIS — K22.70 BARRETT ESOPHAGUS WITHOUT DYSPLASIA: ICD-10-CM

## 2025-07-18 PROCEDURE — 43239 EGD BIOPSY SINGLE/MULTIPLE: CPT | Performed by: INTERNAL MEDICINE

## 2025-07-18 PROCEDURE — 88305 TISSUE EXAM BY PATHOLOGIST: CPT

## 2025-07-18 PROCEDURE — 0753T DGTZ GLS MCRSCP SLD LEVEL IV: CPT

## 2025-07-18 PROCEDURE — 88305 TISSUE EXAM BY PATHOLOGIST: CPT | Performed by: PATHOLOGY

## 2025-07-19 ENCOUNTER — LAB REQUISITION (OUTPATIENT)
Dept: LAB | Facility: HOSPITAL | Age: 50
End: 2025-07-19
Payer: COMMERCIAL

## 2025-07-24 LAB
LABORATORY COMMENT REPORT: NORMAL
PATH REPORT.FINAL DX SPEC: NORMAL
PATH REPORT.GROSS SPEC: NORMAL
PATH REPORT.RELEVANT HX SPEC: NORMAL
PATH REPORT.TOTAL CANCER: NORMAL

## 2025-09-02 DIAGNOSIS — K30 FUNCTIONAL DYSPEPSIA: ICD-10-CM

## 2025-09-02 DIAGNOSIS — K21.9 GASTROESOPHAGEAL REFLUX DISEASE, UNSPECIFIED WHETHER ESOPHAGITIS PRESENT: ICD-10-CM

## 2025-09-02 RX ORDER — OMEPRAZOLE 40 MG/1
40 CAPSULE, DELAYED RELEASE ORAL DAILY
Qty: 30 CAPSULE | Refills: 1 | Status: SHIPPED | OUTPATIENT
Start: 2025-09-02

## 2025-09-02 RX ORDER — DESIPRAMINE HYDROCHLORIDE 10 MG/1
10 TABLET ORAL DAILY
Qty: 90 TABLET | Refills: 0 | Status: SHIPPED | OUTPATIENT
Start: 2025-09-02

## 2025-10-15 ENCOUNTER — APPOINTMENT (OUTPATIENT)
Dept: GASTROENTEROLOGY | Facility: CLINIC | Age: 50
End: 2025-10-15
Payer: COMMERCIAL